# Patient Record
(demographics unavailable — no encounter records)

---

## 2025-03-14 NOTE — HISTORY OF PRESENT ILLNESS
[de-identified] : Patient s/p open inguinal hernia repair with mesh. Patient tolerating diet, pain controlled. Incisions well healed. no signs of recurrence